# Patient Record
Sex: FEMALE | NOT HISPANIC OR LATINO
[De-identification: names, ages, dates, MRNs, and addresses within clinical notes are randomized per-mention and may not be internally consistent; named-entity substitution may affect disease eponyms.]

---

## 2017-02-05 PROBLEM — Z00.00 ENCOUNTER FOR PREVENTIVE HEALTH EXAMINATION: Status: ACTIVE | Noted: 2017-02-05

## 2018-12-04 ENCOUNTER — RECORD ABSTRACTING (OUTPATIENT)
Age: 83
End: 2018-12-04

## 2018-12-04 DIAGNOSIS — Z87.19 PERSONAL HISTORY OF OTHER DISEASES OF THE DIGESTIVE SYSTEM: ICD-10-CM

## 2018-12-04 DIAGNOSIS — Z87.39 PERSONAL HISTORY OF OTHER DISEASES OF THE MUSCULOSKELETAL SYSTEM AND CONNECTIVE TISSUE: ICD-10-CM

## 2018-12-04 DIAGNOSIS — Z83.3 FAMILY HISTORY OF DIABETES MELLITUS: ICD-10-CM

## 2018-12-04 DIAGNOSIS — Z86.39 PERSONAL HISTORY OF OTHER ENDOCRINE, NUTRITIONAL AND METABOLIC DISEASE: ICD-10-CM

## 2018-12-04 DIAGNOSIS — Z86.59 PERSONAL HISTORY OF OTHER MENTAL AND BEHAVIORAL DISORDERS: ICD-10-CM

## 2018-12-04 DIAGNOSIS — Z82.0 FAMILY HISTORY OF EPILEPSY AND OTHER DISEASES OF THE NERVOUS SYSTEM: ICD-10-CM

## 2018-12-04 RX ORDER — FLUDROCORTISONE ACETATE 0.1 MG/1
0.1 TABLET ORAL
Refills: 0 | Status: ACTIVE | COMMUNITY

## 2018-12-04 RX ORDER — MIDODRINE HYDROCHLORIDE 10 MG/1
10 TABLET ORAL
Refills: 0 | Status: ACTIVE | COMMUNITY

## 2018-12-04 RX ORDER — DIGOXIN 125 UG/1
125 TABLET ORAL DAILY
Refills: 0 | Status: ACTIVE | COMMUNITY

## 2018-12-04 RX ORDER — FERROUS SULFATE 325(65) MG
325 (65 FE) TABLET ORAL DAILY
Refills: 0 | Status: ACTIVE | COMMUNITY

## 2018-12-04 RX ORDER — ASPIRIN 81 MG
81 TABLET, DELAYED RELEASE (ENTERIC COATED) ORAL DAILY
Refills: 0 | Status: ACTIVE | COMMUNITY

## 2018-12-04 RX ORDER — OMEPRAZOLE 20 MG/1
20 CAPSULE, DELAYED RELEASE ORAL
Refills: 0 | Status: ACTIVE | COMMUNITY

## 2018-12-04 RX ORDER — SYRING-NEEDL,DISP,INSUL,0.3 ML 31 GX5/16"
31G X 5/16" SYRINGE, EMPTY DISPOSABLE MISCELLANEOUS
Refills: 0 | Status: ACTIVE | COMMUNITY

## 2018-12-04 RX ORDER — MULTIVIT-MIN/FOLIC/VIT K/LYCOP 400-300MCG
50 MCG TABLET ORAL
Refills: 0 | Status: ACTIVE | COMMUNITY

## 2018-12-04 RX ORDER — ATORVASTATIN CALCIUM 80 MG/1
80 TABLET, FILM COATED ORAL DAILY
Refills: 0 | Status: ACTIVE | COMMUNITY

## 2018-12-05 ENCOUNTER — APPOINTMENT (OUTPATIENT)
Dept: ENDOCRINOLOGY | Facility: CLINIC | Age: 83
End: 2018-12-05
Payer: MEDICARE

## 2018-12-05 VITALS
DIASTOLIC BLOOD PRESSURE: 70 MMHG | HEIGHT: 59 IN | BODY MASS INDEX: 22.78 KG/M2 | HEART RATE: 71 BPM | WEIGHT: 113 LBS | SYSTOLIC BLOOD PRESSURE: 108 MMHG

## 2018-12-05 DIAGNOSIS — Z86.79 PERSONAL HISTORY OF OTHER DISEASES OF THE CIRCULATORY SYSTEM: ICD-10-CM

## 2018-12-05 DIAGNOSIS — I25.10 ATHEROSCLEROTIC HEART DISEASE OF NATIVE CORONARY ARTERY W/OUT ANGINA PECTORIS: ICD-10-CM

## 2018-12-05 DIAGNOSIS — Z85.3 PERSONAL HISTORY OF MALIGNANT NEOPLASM OF BREAST: ICD-10-CM

## 2018-12-05 DIAGNOSIS — I10 ESSENTIAL (PRIMARY) HYPERTENSION: ICD-10-CM

## 2018-12-05 LAB — GLUCOSE BLDC GLUCOMTR-MCNC: 210

## 2018-12-05 PROCEDURE — 82962 GLUCOSE BLOOD TEST: CPT

## 2018-12-05 PROCEDURE — 99214 OFFICE O/P EST MOD 30 MIN: CPT | Mod: 25

## 2019-01-14 ENCOUNTER — RX RENEWAL (OUTPATIENT)
Age: 84
End: 2019-01-14

## 2019-02-20 ENCOUNTER — RX RENEWAL (OUTPATIENT)
Age: 84
End: 2019-02-20

## 2019-02-20 RX ORDER — BLOOD SUGAR DIAGNOSTIC
STRIP MISCELLANEOUS
Qty: 400 | Refills: 3 | Status: ACTIVE | COMMUNITY
Start: 2019-02-20 | End: 1900-01-01

## 2019-04-19 LAB
GLUCOSE SERPL-MCNC: 210
HBA1C MFR BLD HPLC: 8.7
LDLC SERPL DIRECT ASSAY-MCNC: 96

## 2019-04-22 ENCOUNTER — APPOINTMENT (OUTPATIENT)
Dept: ENDOCRINOLOGY | Facility: CLINIC | Age: 84
End: 2019-04-22
Payer: MEDICARE

## 2019-04-22 VITALS
SYSTOLIC BLOOD PRESSURE: 92 MMHG | DIASTOLIC BLOOD PRESSURE: 70 MMHG | HEART RATE: 83 BPM | WEIGHT: 116 LBS | HEIGHT: 59 IN | BODY MASS INDEX: 23.39 KG/M2

## 2019-04-22 LAB — GLUCOSE BLDC GLUCOMTR-MCNC: 350

## 2019-04-22 PROCEDURE — 99214 OFFICE O/P EST MOD 30 MIN: CPT | Mod: 25

## 2019-04-22 PROCEDURE — 82962 GLUCOSE BLOOD TEST: CPT

## 2019-04-22 RX ORDER — PAROXETINE HYDROCHLORIDE 10 MG/1
10 TABLET, FILM COATED ORAL
Refills: 0 | Status: DISCONTINUED | COMMUNITY
End: 2019-04-22

## 2019-04-22 RX ORDER — LAMOTRIGINE 100 MG/1
100 TABLET ORAL
Refills: 0 | Status: ACTIVE | COMMUNITY

## 2019-04-22 RX ORDER — LAMOTRIGINE 25 MG/1
25 TABLET ORAL
Qty: 270 | Refills: 0 | Status: DISCONTINUED | COMMUNITY
End: 2019-04-22

## 2019-04-22 RX ORDER — VENLAFAXINE HYDROCHLORIDE 150 MG/1
150 CAPSULE, EXTENDED RELEASE ORAL
Refills: 0 | Status: DISCONTINUED | COMMUNITY
End: 2019-04-22

## 2019-04-22 RX ORDER — DESVENLAFAXINE 100 MG/1
100 TABLET, EXTENDED RELEASE ORAL
Qty: 90 | Refills: 0 | Status: ACTIVE | COMMUNITY

## 2019-04-22 NOTE — HISTORY OF PRESENT ILLNESS
[FreeTextEntry1] : Patient is seen today for a routine diabetic follow up.\par Quality:  type 2  DM\par Severity:  moderate\par Duration of diabetes:  over 20 years\par Associated Complications/ Symptoms:  nephropathy and neuropathy\par Modifying Factors:  Better with insulin\par \par Patient tests blood glucose 3-4 times per day.  No log today.  \par \par Current Diabetic Medication Regimen:\par Novolin 70/30  14 units with breakfast, 8 units with lunch and 16 units with dinner\par \par Hx of hypotension (? autonomic neuropathy).  AM cortisol was low, but had normal Cosyntropin test.   Currently on Florinef for treatment of hypotension. \par \par

## 2019-04-22 NOTE — REVIEW OF SYSTEMS
[Fatigue] : fatigue [Heartburn] : heartburn [Shortness Of Breath] : shortness of breath [Depression] : depression [Chest Pain] : no chest pain

## 2019-04-22 NOTE — PHYSICAL EXAM
[No Acute Distress] : no acute distress [Normal Sclera/Conjunctiva] : normal sclera/conjunctiva [No Proptosis] : no proptosis [No Neck Mass] : no neck mass was observed [No LAD] : no lymphadenopathy [Thyroid Not Enlarged] : the thyroid was not enlarged [No Thyroid Nodules] : there were no palpable thyroid nodules [Clear to Auscultation] : lungs were clear to auscultation bilaterally [Normal Rate and Effort] : normal respiratory rhythm and effort [Normal S1, S2] : normal S1 and S2 [Normal Rate] : heart rate was normal  [Murmurs] : no murmurs [No Edema] : there was no peripheral edema [Normal Insight/Judgement] : insight and judgment were intact [Normal Affect] : the affect was normal [Normal Mood] : the mood was normal [Acanthosis Nigricans] : no acanthosis nigricans [de-identified] : elderly female [de-identified] : irregular rhythm.

## 2019-04-22 NOTE — ASSESSMENT
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 autonomic neuropathy and hyperlipidemia.   Her glycemic control has worsened.  \par \par 1.  T2DM-   Daughter will drop off glucose log so that insulin doses can be titrated.  Likely needs more insulin with breakfast and lunch. \par 2. Hyperlipidemia-  continue statin.  \par 3.  Hypotension/ autonomic neuropathy-  stable, as per renal.  On Florinef and Midordrine.

## 2019-08-13 ENCOUNTER — APPOINTMENT (OUTPATIENT)
Dept: ENDOCRINOLOGY | Facility: CLINIC | Age: 84
End: 2019-08-13
Payer: MEDICARE

## 2019-08-13 VITALS
HEIGHT: 59 IN | DIASTOLIC BLOOD PRESSURE: 62 MMHG | SYSTOLIC BLOOD PRESSURE: 96 MMHG | OXYGEN SATURATION: 96 % | BODY MASS INDEX: 22.78 KG/M2 | HEART RATE: 82 BPM | WEIGHT: 113 LBS

## 2019-08-13 LAB — GLUCOSE BLDC GLUCOMTR-MCNC: 201

## 2019-08-13 PROCEDURE — 99214 OFFICE O/P EST MOD 30 MIN: CPT | Mod: 25

## 2019-08-13 PROCEDURE — 82962 GLUCOSE BLOOD TEST: CPT

## 2019-08-13 NOTE — DATA REVIEWED
[FreeTextEntry1] : LABS:\par 7/30/2019:\par LDL 83\par TSH  2.576\par A1c 8%\par 25(OH)D  32\par Cr  1.2\par \par 10/17/2018\par TSH  2.4  Free T4  1.06\par 25(OH)D:  39.8

## 2019-08-13 NOTE — HISTORY OF PRESENT ILLNESS
[FreeTextEntry1] : Patient is seen today for a routine diabetic follow up.\par Quality:  type 2  DM\par Severity:  moderate\par Duration of diabetes:  over 20 years\par Associated Complications/ Symptoms:  nephropathy and neuropathy\par Modifying Factors:  Better with insulin\par \par Patient tests blood glucose 3-4 times per day.     Reviewed log and no recent hypoglycemia.  Having some hyperglycemia before lunch and dinner.  \par \par Current Diabetic Medication Regimen:\par Novolin 70/30 insulin:\par 16 units with breakfast, 9 units with lunch, 16 units with dinner. \par \par Hx of hypotension (? autonomic neuropathy).  AM cortisol was low, but had normal Cosyntropin test.   Currently on Florinef for treatment of hypotension. \par \par

## 2019-08-13 NOTE — ASSESSMENT
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 autonomic neuropathy and hyperlipidemia.   Her glycemic control is improving.\par \par 1.  T2DM-   increase lunch dose of novolin 70/30 to 10 units.  \par 2. Hyperlipidemia-  continue statin.  \par 3.  Hypotension/ autonomic neuropathy-  stable, as per renal.  On Florinef and Midordrine.

## 2019-08-13 NOTE — PHYSICAL EXAM
[No Acute Distress] : no acute distress [Normal Sclera/Conjunctiva] : normal sclera/conjunctiva [No Proptosis] : no proptosis [No Neck Mass] : no neck mass was observed [No LAD] : no lymphadenopathy [Thyroid Not Enlarged] : the thyroid was not enlarged [No Thyroid Nodules] : there were no palpable thyroid nodules [Normal Rate and Effort] : normal respiratory rhythm and effort [Clear to Auscultation] : lungs were clear to auscultation bilaterally [Normal Rate] : heart rate was normal  [Normal S1, S2] : normal S1 and S2 [Murmurs] : no murmurs [Normal Insight/Judgement] : insight and judgment were intact [Normal Affect] : the affect was normal [Normal Mood] : the mood was normal [Acanthosis Nigricans] : no acanthosis nigricans [de-identified] : elderly female [de-identified] : irregular rhythm.

## 2019-12-02 ENCOUNTER — APPOINTMENT (OUTPATIENT)
Dept: ENDOCRINOLOGY | Facility: CLINIC | Age: 84
End: 2019-12-02
Payer: MEDICARE

## 2019-12-02 VITALS
BODY MASS INDEX: 23.59 KG/M2 | HEART RATE: 78 BPM | HEIGHT: 59 IN | WEIGHT: 117 LBS | OXYGEN SATURATION: 96 % | DIASTOLIC BLOOD PRESSURE: 68 MMHG | SYSTOLIC BLOOD PRESSURE: 102 MMHG

## 2019-12-02 LAB
GLUCOSE BLDC GLUCOMTR-MCNC: 235
HBA1C MFR BLD HPLC: 7.8
LDLC SERPL DIRECT ASSAY-MCNC: 127
MICROALBUMIN/CREAT UR-RTO: 258

## 2019-12-02 PROCEDURE — 99214 OFFICE O/P EST MOD 30 MIN: CPT | Mod: 25

## 2019-12-02 PROCEDURE — 82962 GLUCOSE BLOOD TEST: CPT

## 2019-12-02 NOTE — REVIEW OF SYSTEMS
[Dysphagia] : dysphagia [Shortness Of Breath] : shortness of breath [Decreased Appetite] : appetite not decreased [Fatigue] : no fatigue [Chest Pain] : no chest pain [Nausea] : no nausea

## 2019-12-02 NOTE — HISTORY OF PRESENT ILLNESS
[FreeTextEntry1] : Patient is seen today for a routine diabetic follow up.\par Quality:  type 2  DM\par Severity:  moderate\par Duration of diabetes:  over 20 years\par Associated Complications/ Symptoms:  nephropathy and neuropathy\par Modifying Factors:  Better with insulin\par \par Patient tests blood glucose 3-4 times per day.    No log at visit today, but daughter reports that BG has been stable.    No recent hypoglycemia.  \par \par Current Diabetic Medication Regimen:\par Novolin 70/30 insulin:\par 16 units with breakfast, 10 units with lunch, 16 units with dinner. \par \par Hx of hypotension (? autonomic neuropathy).  AM cortisol was low, but had normal Cosyntropin test.   Currently on Florinef for treatment of hypotension. \par \par Recently her  passed away.  Has been saddened, but a lot of family support. \par

## 2019-12-02 NOTE — PHYSICAL EXAM
[No Acute Distress] : no acute distress [Normal Sclera/Conjunctiva] : normal sclera/conjunctiva [No Proptosis] : no proptosis [No Neck Mass] : no neck mass was observed [No LAD] : no lymphadenopathy [Thyroid Not Enlarged] : the thyroid was not enlarged [No Thyroid Nodules] : there were no palpable thyroid nodules [Normal Rate and Effort] : normal respiratory rhythm and effort [Clear to Auscultation] : lungs were clear to auscultation bilaterally [Normal Rate] : heart rate was normal  [Normal S1, S2] : normal S1 and S2 [Murmurs] : no murmurs [Normal Insight/Judgement] : insight and judgment were intact [Normal Affect] : the affect was normal [Normal Mood] : the mood was normal [Acanthosis Nigricans] : no acanthosis nigricans [de-identified] : elderly female [de-identified] : irregular rhythm.

## 2019-12-02 NOTE — DATA REVIEWED
[FreeTextEntry1] : 11/25/2019:\par A1c 7.8%\par LDL 127LABS:\par \par 7/30/2019:\par LDL 83\par TSH  2.576\par A1c 8%\par 25(OH)D  32\par Cr  1.2\par \par 10/17/2018\par TSH  2.4  Free T4  1.06\par 25(OH)D:  39.8

## 2019-12-02 NOTE — ASSESSMENT
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 autonomic neuropathy and hyperlipidemia.   Her glycemic control is reasonable for her age. \par \par 1.  T2DM-   continue current doses of insulin.    \par 2. Hyperlipidemia-  continue statin.  \par 3.  Hypotension/ autonomic neuropathy-  stable, as per renal.  On Florinef and Midordrine.

## 2020-05-06 ENCOUNTER — RX RENEWAL (OUTPATIENT)
Age: 85
End: 2020-05-06

## 2020-05-12 ENCOUNTER — APPOINTMENT (OUTPATIENT)
Dept: ENDOCRINOLOGY | Facility: CLINIC | Age: 85
End: 2020-05-12

## 2020-09-04 LAB
HBA1C MFR BLD HPLC: 6.9
LDLC SERPL DIRECT ASSAY-MCNC: 105
MICROALBUMIN/CREAT 24H UR-RTO: 441

## 2020-09-08 ENCOUNTER — APPOINTMENT (OUTPATIENT)
Dept: ENDOCRINOLOGY | Facility: CLINIC | Age: 85
End: 2020-09-08
Payer: MEDICARE

## 2020-09-08 VITALS
BODY MASS INDEX: 23.39 KG/M2 | SYSTOLIC BLOOD PRESSURE: 100 MMHG | HEART RATE: 103 BPM | DIASTOLIC BLOOD PRESSURE: 58 MMHG | HEIGHT: 59 IN | WEIGHT: 116 LBS

## 2020-09-08 DIAGNOSIS — I95.89 OTHER HYPOTENSION: ICD-10-CM

## 2020-09-08 DIAGNOSIS — E11.40 TYPE 2 DIABETES MELLITUS WITH DIABETIC NEUROPATHY, UNSPECIFIED: ICD-10-CM

## 2020-09-08 DIAGNOSIS — Z79.4 TYPE 2 DIABETES MELLITUS WITH DIABETIC NEUROPATHY, UNSPECIFIED: ICD-10-CM

## 2020-09-08 DIAGNOSIS — E78.5 HYPERLIPIDEMIA, UNSPECIFIED: ICD-10-CM

## 2020-09-08 LAB — GLUCOSE BLDC GLUCOMTR-MCNC: 199

## 2020-09-08 PROCEDURE — 82962 GLUCOSE BLOOD TEST: CPT

## 2020-09-08 PROCEDURE — 99214 OFFICE O/P EST MOD 30 MIN: CPT | Mod: 25

## 2020-09-08 NOTE — ASSESSMENT
[FreeTextEntry1] : 89 year old female with type 2 DM and associated neuropathy and nephropathy, hypotension due to ? autonomic neuropathy, and hyperlipidemia. Glycemic control is acceptable.\par \par 1. T2DM- not a candidate for tight control given age and risk of hypoglycemia.\par -Continue current insulin dose.\par -Continue SMBG 3-4x daily.\par -Repeat A1c in 6 months.\par -Call office if BG trends high 200s-300s before lunch- can increase Novolin with breakfast.\par \par 2. Hyperlipidemia- improving on high dose statin.\par -Continue statin.\par -Repeat lipids in 6 months.\par \par 3. Hypotension/autonomic neuropathy- stable\par -Continue Florinef and Midodrine as per cardiology.\par

## 2020-09-08 NOTE — PHYSICAL EXAM
[Alert] : alert [No Acute Distress] : no acute distress [Well Nourished] : well nourished [Well Developed] : well developed [Normal Sclera/Conjunctiva] : normal sclera/conjunctiva [EOMI] : extra ocular movement intact [No Proptosis] : no proptosis [No Thyroid Nodules] : no palpable thyroid nodules [Thyroid Not Enlarged] : the thyroid was not enlarged [No Respiratory Distress] : no respiratory distress [No Accessory Muscle Use] : no accessory muscle use [Clear to Auscultation] : lungs were clear to auscultation bilaterally [Normal S1, S2] : normal S1 and S2 [Normal Rate] : heart rate was normal [Regular Rhythm] : with a regular rhythm [Not Distended] : not distended [Normal Anterior Cervical Nodes] : no anterior cervical lymphadenopathy [Normal Posterior Cervical Nodes] : no posterior cervical lymphadenopathy [No Spinal Tenderness] : no spinal tenderness [Normal Strength/Tone] : muscle strength and tone were normal [No Rash] : no rash [Oriented x3] : oriented to person, place, and time [Normal Mood] : the mood was normal [Normal Affect] : the affect was normal [Acanthosis Nigricans] : no acanthosis nigricans [de-identified] : B/L LE edema

## 2020-09-08 NOTE — HISTORY OF PRESENT ILLNESS
[FreeTextEntry1] : Quality: type 2 DM\par Severity: moderate\par Duration of diabetes: over 20 years\par Associated Complications/ Symptoms: nephropathy and neuropathy\par Modifying Factors: Better with insulin\par \par SMBG 3-4 times per day. Reviewed logs. BG is low 100s before breakfast and dinner. Highest before lunch, often low to mid-200s with occasional 300s.\par Current , eggs and cheese with half a scooped out bagel and a biscuit for breakfast 1.5 hours ago\par \par Current Diabetic Medication Regimen:\par Novolin 70/30 insulin:\par 16 units with breakfast, 10 units with lunch, 16 units with dinner. \par \par Hx of hypotension (? autonomic neuropathy). AM cortisol was low, but had normal Cosyntropin test. Currently on Florinef and Midodrine for treatment of hypotension. \par \par eye: August 2020, seeing retina specialist next week for retinal bleed in right eye.\par foot: September 2020, swelling in B/L feet. +neuropathy\par kidney: CKD\par heart: history of valve replacement, CABG\par \par diet: no changes\par exercise: walking when the weather allows\par weight: stable

## 2020-09-08 NOTE — REVIEW OF SYSTEMS
[Palpitations] : palpitations [SOB on Exertion] : shortness of breath on exertion [Pain/Numbness of Digits] : pain/numbness of digits [Polydipsia] : polydipsia [Recent Weight Loss (___ Lbs)] : no recent weight loss [Recent Weight Gain (___ Lbs)] : no recent weight gain [Chest Pain] : no chest pain [Abdominal Pain] : no abdominal pain [Shortness Of Breath] : no shortness of breath [Nausea] : no nausea [Vomiting] : no vomiting [Polyuria] : no polyuria

## 2020-09-20 ENCOUNTER — RX RENEWAL (OUTPATIENT)
Age: 85
End: 2020-09-20

## 2020-10-30 ENCOUNTER — RX RENEWAL (OUTPATIENT)
Age: 85
End: 2020-10-30

## 2020-10-30 RX ORDER — BLOOD SUGAR DIAGNOSTIC
STRIP MISCELLANEOUS
Qty: 400 | Refills: 0 | Status: ACTIVE | COMMUNITY
Start: 2020-05-06 | End: 1900-01-01

## 2021-03-09 ENCOUNTER — APPOINTMENT (OUTPATIENT)
Dept: ENDOCRINOLOGY | Facility: CLINIC | Age: 86
End: 2021-03-09

## 2021-03-30 RX ORDER — SYRINGE AND NEEDLE,INSULIN,1ML 29 G X1/2"
31G X 5/16" SYRINGE, EMPTY DISPOSABLE MISCELLANEOUS
Qty: 300 | Refills: 1 | Status: ACTIVE | COMMUNITY
Start: 2019-01-14 | End: 1900-01-01

## 2021-06-04 ENCOUNTER — APPOINTMENT (OUTPATIENT)
Dept: ENDOCRINOLOGY | Facility: CLINIC | Age: 86
End: 2021-06-04

## 2024-08-16 NOTE — REVIEW OF SYSTEMS
[Fatigue] : fatigue [Shortness Of Breath] : shortness of breath [Joint Pain] : joint pain [Anxiety] : anxiety [Chest Pain] : no chest pain [Nausea] : no nausea Vaginal Delivery